# Patient Record
Sex: FEMALE | Race: WHITE | NOT HISPANIC OR LATINO | URBAN - METROPOLITAN AREA
[De-identification: names, ages, dates, MRNs, and addresses within clinical notes are randomized per-mention and may not be internally consistent; named-entity substitution may affect disease eponyms.]

---

## 2021-09-09 ENCOUNTER — TELEPHONE ENCOUNTER (OUTPATIENT)
Dept: URBAN - METROPOLITAN AREA CLINIC 113 | Facility: CLINIC | Age: 76
End: 2021-09-09

## 2021-09-14 ENCOUNTER — OFFICE VISIT (OUTPATIENT)
Dept: URBAN - METROPOLITAN AREA CLINIC 113 | Facility: CLINIC | Age: 76
End: 2021-09-14
Payer: MEDICARE

## 2021-09-14 ENCOUNTER — LAB OUTSIDE AN ENCOUNTER (OUTPATIENT)
Dept: URBAN - METROPOLITAN AREA CLINIC 113 | Facility: CLINIC | Age: 76
End: 2021-09-14

## 2021-09-14 ENCOUNTER — WEB ENCOUNTER (OUTPATIENT)
Dept: URBAN - METROPOLITAN AREA CLINIC 113 | Facility: CLINIC | Age: 76
End: 2021-09-14

## 2021-09-14 VITALS
HEART RATE: 100 BPM | TEMPERATURE: 98.2 F | SYSTOLIC BLOOD PRESSURE: 139 MMHG | HEIGHT: 64 IN | WEIGHT: 125 LBS | BODY MASS INDEX: 21.34 KG/M2 | DIASTOLIC BLOOD PRESSURE: 78 MMHG

## 2021-09-14 DIAGNOSIS — K59.01 CONSTIPATION BY DELAYED COLONIC TRANSIT: ICD-10-CM

## 2021-09-14 DIAGNOSIS — R93.5 ABNORMAL CT OF THE ABDOMEN: ICD-10-CM

## 2021-09-14 DIAGNOSIS — R63.4 WEIGHT LOSS: ICD-10-CM

## 2021-09-14 PROCEDURE — 99204 OFFICE O/P NEW MOD 45 MIN: CPT | Performed by: INTERNAL MEDICINE

## 2021-09-14 RX ORDER — ACETAMINOPHEN 325 MG
1 TABLET AS NEEDED TABLET ORAL
Status: ACTIVE | COMMUNITY

## 2021-09-14 RX ORDER — CALCIUM CARBONATE/VITAMIN D3 600MG-5MCG
1 TABLET WITH A MEAL TABLET ORAL ONCE A DAY
Status: ACTIVE | COMMUNITY

## 2021-09-14 RX ORDER — RED YEAST RICE 600 MG
AS DIRECTED CAPSULE ORAL
Status: ACTIVE | COMMUNITY

## 2021-09-14 RX ORDER — OMEPRAZOLE 10 MG/1
1 CAPSULE 30 MINUTES BEFORE MORNING MEAL CAPSULE, DELAYED RELEASE ORAL ONCE A DAY
Status: ACTIVE | COMMUNITY

## 2021-09-14 RX ORDER — POLYETHYLENE GLYCOL 3350, SODIUM CHLORIDE, SODIUM BICARBONATE, POTASSIUM CHLORIDE 420; 11.2; 5.72; 1.48 G/4L; G/4L; G/4L; G/4L
420 G POWDER, FOR SOLUTION ORAL ONCE
Qty: 420 G | Refills: 0 | OUTPATIENT
Start: 2021-09-14 | End: 2021-09-15

## 2021-09-14 RX ORDER — BIOTIN 10 MG
1 TABLET TABLET ORAL ONCE A DAY
Status: ACTIVE | COMMUNITY

## 2021-09-14 RX ORDER — ALPRAZOLAM 0.5 MG/1
1 TABLET TABLET ORAL TWICE A DAY
Status: ACTIVE | COMMUNITY

## 2021-09-14 RX ORDER — HYDROCHLOROTHIAZIDE 25 MG/1
1 TABLET IN THE MORNING TABLET ORAL ONCE A DAY
Status: ACTIVE | COMMUNITY

## 2021-09-14 RX ORDER — LEVOTHYROXINE SODIUM 100 UG/1
1 TABLET IN THE MORNING ON AN EMPTY STOMACH TABLET ORAL ONCE A DAY
Status: ACTIVE | COMMUNITY

## 2021-09-14 RX ORDER — MAGNESIUM OXIDE/MAG AA CHELATE 300 MG
1 CAPSULE WITH A MEAL CAPSULE ORAL ONCE A DAY
Status: ACTIVE | COMMUNITY

## 2021-09-14 RX ORDER — B-COMPLEX WITH VITAMIN C
1 TABLET TABLET ORAL ONCE A DAY
Status: ACTIVE | COMMUNITY

## 2021-09-14 RX ORDER — MULTIVITAMIN
1 TABLET TABLET ORAL ONCE A DAY
Status: ACTIVE | COMMUNITY

## 2021-09-14 NOTE — HPI-TODAY'S VISIT:
77 yo woman referred by Dr. Prabhakar for evaluation of abnormal CT scan. A copy of today's visit will be forwarded to the referring provider.  She has a past medical history of HTN, HLD, prediabetes, dyspnea on exertion, unintentional weight loss, anxiety, chronic osteoarthritis. A CT of the chest abdomen and pelvis with contrast was performed given her complaints of dyspnea on exertion and weight loss of 20 pounds over a 6 month period. Chest CT was negative for pulmonary embolus. There were thyroid nodules, which was recommended follow up with thyroid ultrasound. Abdominal CT findings demonstrate a possible rectal mass with constipation and possible obstruction the stool ball in the rectum. A colonoscopy was recommended to exclude cancer. There were noninflammed colonic diverticula, vascular calcifications, and gallstones. Labs revealed a normal lipase, normal CEA, normal CA 19-9, normal TSH, elevated cholesterol, normal BMP and CBC.  She continues to lose weight. She tells me that her total weight loss is up to 25 pounds. She tells me that her clothes are falling off of her, and she has nothing to wear. There is no heartburn or dysphagia. There is no nausea or vomiting. No abdominal pain. There is no early satiety. She tells me that her appetite is decreasing with age. She eats 2 meals per day, usually brunch and dinner. She typically eats an egg, bran or banana muffin, king, orange juice and milk for her brunch time meal. She normally eats vegetables, chicken for dinner. She tells me her meals sizes are about a handful. She tells me that she is full after eating, but is able to complete her meals. She has bowel movements variably. For the last couple of days, she has had looser stools following  eating artichoke dip. Other times, she can go 3 or 4 days sans bowel movements. There is no blood per rectum. There is no proctalgia.

## 2021-10-19 ENCOUNTER — OFFICE VISIT (OUTPATIENT)
Dept: URBAN - METROPOLITAN AREA SURGERY CENTER 25 | Facility: SURGERY CENTER | Age: 76
End: 2021-10-19
Payer: MEDICARE

## 2021-10-19 DIAGNOSIS — R93.3 ABN FINDINGS-GI TRACT: ICD-10-CM

## 2021-10-19 PROCEDURE — G8907 PT DOC NO EVENTS ON DISCHARG: HCPCS | Performed by: INTERNAL MEDICINE

## 2021-10-19 PROCEDURE — 45378 DIAGNOSTIC COLONOSCOPY: CPT | Performed by: INTERNAL MEDICINE

## 2021-11-16 ENCOUNTER — OFFICE VISIT (OUTPATIENT)
Dept: URBAN - METROPOLITAN AREA CLINIC 113 | Facility: CLINIC | Age: 76
End: 2021-11-16
Payer: MEDICARE

## 2021-11-16 VITALS
TEMPERATURE: 97.7 F | SYSTOLIC BLOOD PRESSURE: 156 MMHG | HEIGHT: 64 IN | BODY MASS INDEX: 20.14 KG/M2 | HEART RATE: 93 BPM | DIASTOLIC BLOOD PRESSURE: 82 MMHG | RESPIRATION RATE: 18 BRPM | WEIGHT: 118 LBS

## 2021-11-16 DIAGNOSIS — R63.4 WEIGHT LOSS: ICD-10-CM

## 2021-11-16 DIAGNOSIS — K59.01 CONSTIPATION BY DELAYED COLONIC TRANSIT: ICD-10-CM

## 2021-11-16 DIAGNOSIS — R93.5 ABNORMAL CT OF THE ABDOMEN: ICD-10-CM

## 2021-11-16 PROCEDURE — 99213 OFFICE O/P EST LOW 20 MIN: CPT | Performed by: NURSE PRACTITIONER

## 2021-11-16 RX ORDER — ACETAMINOPHEN 325 MG
1 TABLET AS NEEDED TABLET ORAL
Status: ACTIVE | COMMUNITY

## 2021-11-16 RX ORDER — RED YEAST RICE 600 MG
AS DIRECTED CAPSULE ORAL
Status: ACTIVE | COMMUNITY

## 2021-11-16 RX ORDER — B-COMPLEX WITH VITAMIN C
1 TABLET TABLET ORAL ONCE A DAY
Status: ACTIVE | COMMUNITY

## 2021-11-16 RX ORDER — MAGNESIUM OXIDE/MAG AA CHELATE 300 MG
1 CAPSULE WITH A MEAL CAPSULE ORAL ONCE A DAY
Status: ACTIVE | COMMUNITY

## 2021-11-16 RX ORDER — OMEPRAZOLE 10 MG/1
1 CAPSULE 30 MINUTES BEFORE MORNING MEAL CAPSULE, DELAYED RELEASE ORAL ONCE A DAY
Status: ACTIVE | COMMUNITY

## 2021-11-16 RX ORDER — CALCIUM CARBONATE/VITAMIN D3 600MG-5MCG
1 TABLET WITH A MEAL TABLET ORAL ONCE A DAY
Status: ACTIVE | COMMUNITY

## 2021-11-16 RX ORDER — HYDROCHLOROTHIAZIDE 25 MG/1
1 TABLET IN THE MORNING TABLET ORAL ONCE A DAY
Status: DISCONTINUED | COMMUNITY

## 2021-11-16 RX ORDER — ALPRAZOLAM 0.5 MG/1
1 TABLET TABLET ORAL TWICE A DAY
Status: ACTIVE | COMMUNITY

## 2021-11-16 RX ORDER — LEVOTHYROXINE SODIUM 100 UG/1
1 TABLET IN THE MORNING ON AN EMPTY STOMACH TABLET ORAL ONCE A DAY
Status: ACTIVE | COMMUNITY

## 2021-11-16 RX ORDER — MULTIVITAMIN
1 TABLET TABLET ORAL ONCE A DAY
Status: ACTIVE | COMMUNITY

## 2021-11-16 RX ORDER — BIOTIN 10 MG
1 TABLET TABLET ORAL ONCE A DAY
Status: ACTIVE | COMMUNITY

## 2021-11-16 NOTE — HPI-OTHER HISTORIES
A CT of the chest abdomen and pelvis with contrast was performed given her complaints of dyspnea on exertion and weight loss of 20 pounds over a 6 month period. Chest CT was negative for pulmonary embolus. There were thyroid nodules, which was recommended follow up with thyroid ultrasound. Abdominal CT findings demonstrate a possible rectal mass with constipation and possible obstruction the stool ball in the rectum. A colonoscopy was recommended to exclude cancer. There were noninflammed colonic diverticula, vascular calcifications, and gallstones. Labs revealed a normal lipase, normal CEA, normal CA 19-9, normal TSH, elevated cholesterol, normal BMP and CBC.

## 2021-11-16 NOTE — HPI-TODAY'S VISIT:
75 yo female with a history of hypertension, hyperlipidemia, prediabetes, presenting for follow-up after colonoscopy, performed for evaluation of abnormal CT findings.  She was seen in the office on 9/14/21 for evaluation of abnormal CT findings, possible rectal mass, in the setting of unintentional weight loss. She was recommended a daily bowel regimen with MiraLAX for management of constipation and a diagnostic colonoscopy was planned.  Colonoscopy 10/19/21 revealed moderate external hemorrhoids but was otherwise unremarkable.  Her constipation has responded to milk of magnesia. She was experiencing diarrhea with 2 tablespoons nightly so she reduced this to 1 tablespoon. She is having 1-2 nonbloody stools daily on this regimen. No abdominal pain, nausea or vomiting. Her appetite has improved, but weight has declined an additional 7lb since the time of her last visit. She thinks this may be related to her thyroid, which will be followed up later this week.

## 2021-12-30 ENCOUNTER — CLAIMS CREATED FROM THE CLAIM WINDOW (OUTPATIENT)
Dept: URBAN - METROPOLITAN AREA CLINIC 113 | Facility: CLINIC | Age: 76
End: 2021-12-30
Payer: MEDICARE

## 2021-12-30 ENCOUNTER — LAB OUTSIDE AN ENCOUNTER (OUTPATIENT)
Dept: URBAN - METROPOLITAN AREA CLINIC 113 | Facility: CLINIC | Age: 76
End: 2021-12-30

## 2021-12-30 VITALS
RESPIRATION RATE: 18 BRPM | HEART RATE: 89 BPM | BODY MASS INDEX: 19.81 KG/M2 | WEIGHT: 116 LBS | HEIGHT: 64 IN | DIASTOLIC BLOOD PRESSURE: 76 MMHG | TEMPERATURE: 98 F | SYSTOLIC BLOOD PRESSURE: 162 MMHG

## 2021-12-30 DIAGNOSIS — K80.20 CHOLELITHIASIS: ICD-10-CM

## 2021-12-30 DIAGNOSIS — K59.01 CONSTIPATION: ICD-10-CM

## 2021-12-30 DIAGNOSIS — R63.4 WEIGHT LOSS: ICD-10-CM

## 2021-12-30 DIAGNOSIS — K59.09 CONSTIPATION: ICD-10-CM

## 2021-12-30 DIAGNOSIS — K64.8 EXTERNAL HEMORRHOIDS: ICD-10-CM

## 2021-12-30 DIAGNOSIS — K59.01 CONSTIPATION BY DELAYED COLONIC TRANSIT: ICD-10-CM

## 2021-12-30 PROBLEM — 23913003 EXTERNAL HEMORRHOIDS: Status: ACTIVE | Noted: 2021-12-30

## 2021-12-30 PROBLEM — 14760008 CONSTIPATION: Status: ACTIVE | Noted: 2021-12-30

## 2021-12-30 PROBLEM — 266474003 CHOLELITHIASIS: Status: ACTIVE | Noted: 2021-12-30

## 2021-12-30 PROCEDURE — 99214 OFFICE O/P EST MOD 30 MIN: CPT | Performed by: INTERNAL MEDICINE

## 2021-12-30 RX ORDER — LEVOTHYROXINE SODIUM 100 UG/1
1 TABLET IN THE MORNING ON AN EMPTY STOMACH TABLET ORAL ONCE A DAY
Status: ACTIVE | COMMUNITY

## 2021-12-30 RX ORDER — BIOTIN 10 MG
1 TABLET TABLET ORAL ONCE A DAY
Status: ACTIVE | COMMUNITY

## 2021-12-30 RX ORDER — MULTIVITAMIN
1 TABLET TABLET ORAL ONCE A DAY
Status: ACTIVE | COMMUNITY

## 2021-12-30 RX ORDER — B-COMPLEX WITH VITAMIN C
1 TABLET TABLET ORAL ONCE A DAY
Status: ACTIVE | COMMUNITY

## 2021-12-30 RX ORDER — MAGNESIUM OXIDE/MAG AA CHELATE 300 MG
1 CAPSULE WITH A MEAL CAPSULE ORAL ONCE A DAY
Status: ACTIVE | COMMUNITY

## 2021-12-30 RX ORDER — ACETAMINOPHEN 325 MG
1 TABLET AS NEEDED TABLET ORAL
Status: ACTIVE | COMMUNITY

## 2021-12-30 RX ORDER — OMEPRAZOLE 10 MG/1
1 CAPSULE 30 MINUTES BEFORE MORNING MEAL CAPSULE, DELAYED RELEASE ORAL ONCE A DAY
Status: ACTIVE | COMMUNITY

## 2021-12-30 RX ORDER — RED YEAST RICE 600 MG
AS DIRECTED CAPSULE ORAL
Status: ACTIVE | COMMUNITY

## 2021-12-30 RX ORDER — ALPRAZOLAM 0.5 MG/1
1 TABLET TABLET ORAL TWICE A DAY
Status: ACTIVE | COMMUNITY

## 2021-12-30 RX ORDER — CALCIUM CARBONATE/VITAMIN D3 600MG-5MCG
1 TABLET WITH A MEAL TABLET ORAL ONCE A DAY
Status: ACTIVE | COMMUNITY

## 2021-12-30 NOTE — HPI-TODAY'S VISIT:
Very 76-year-old female follows up with a history of constipation.  She underwent recent colonoscopy . She has been using intermittent MOM with success. But she is working on the timing and dosage. No rectal bleeding or melena. No abdominal pain. No further episodes of impaction.  . Labs December 2021.  CBC BMP were normal . Colonoscopy October 2021.  Moderate external hemorrhoids, otherwise negative . CT scan chest abdomen pelvis August 2021.  Solitary gallstone.  Suggestive of rectal mass which was later refuted by colonoscopy. Heterogeneous thyroid.

## 2022-03-25 ENCOUNTER — OFFICE VISIT (OUTPATIENT)
Dept: URBAN - METROPOLITAN AREA CLINIC 113 | Facility: CLINIC | Age: 77
End: 2022-03-25
Payer: MEDICARE

## 2022-03-25 VITALS
SYSTOLIC BLOOD PRESSURE: 155 MMHG | HEART RATE: 83 BPM | RESPIRATION RATE: 18 BRPM | HEIGHT: 64 IN | BODY MASS INDEX: 18.95 KG/M2 | DIASTOLIC BLOOD PRESSURE: 84 MMHG | TEMPERATURE: 97.1 F | WEIGHT: 111 LBS

## 2022-03-25 DIAGNOSIS — R19.7 DIARRHEA: ICD-10-CM

## 2022-03-25 DIAGNOSIS — R63.4 WEIGHT LOSS: ICD-10-CM

## 2022-03-25 PROCEDURE — 99214 OFFICE O/P EST MOD 30 MIN: CPT | Performed by: NURSE PRACTITIONER

## 2022-03-25 RX ORDER — CALCIUM CARBONATE/VITAMIN D3 600MG-5MCG
1 TABLET WITH A MEAL TABLET ORAL ONCE A DAY
Status: ACTIVE | COMMUNITY

## 2022-03-25 RX ORDER — RED YEAST RICE 600 MG
AS DIRECTED CAPSULE ORAL
Status: ACTIVE | COMMUNITY

## 2022-03-25 RX ORDER — LEVOTHYROXINE SODIUM 100 UG/1
1 TABLET IN THE MORNING ON AN EMPTY STOMACH TABLET ORAL ONCE A DAY
Status: DISCONTINUED | COMMUNITY

## 2022-03-25 RX ORDER — OMEPRAZOLE 40 MG/1
1 CAPSULE 30 MINUTES BEFORE MORNING MEAL CAPSULE, DELAYED RELEASE ORAL ONCE A DAY
Status: ACTIVE | COMMUNITY

## 2022-03-25 RX ORDER — ACETAMINOPHEN 325 MG
1 TABLET AS NEEDED TABLET ORAL
Status: ACTIVE | COMMUNITY

## 2022-03-25 RX ORDER — ALPRAZOLAM 0.5 MG/1
1 TABLET TABLET ORAL TWICE A DAY
Status: ACTIVE | COMMUNITY

## 2022-03-25 RX ORDER — B-COMPLEX WITH VITAMIN C
1 TABLET TABLET ORAL ONCE A DAY
Status: ACTIVE | COMMUNITY

## 2022-03-25 RX ORDER — BIOTIN 10 MG
1 TABLET TABLET ORAL ONCE A DAY
Status: ACTIVE | COMMUNITY

## 2022-03-25 RX ORDER — MULTIVITAMIN
1 TABLET TABLET ORAL ONCE A DAY
Status: ACTIVE | COMMUNITY

## 2022-03-25 RX ORDER — MAGNESIUM OXIDE/MAG AA CHELATE 300 MG
1 CAPSULE WITH A MEAL CAPSULE ORAL ONCE A DAY
Status: ACTIVE | COMMUNITY

## 2022-03-25 NOTE — HPI-OTHER HISTORIES
Colonoscopy 10/19/21 revealed moderate external hemorrhoids but was otherwise unremarkable.  A CT of the chest abdomen and pelvis with contrast was performed given her complaints of dyspnea on exertion and weight loss of 20 pounds over a 6 month period. Chest CT was negative for pulmonary embolus. There were thyroid nodules, which was recommended follow up with thyroid ultrasound. Abdominal CT findings demonstrate a possible rectal mass with constipation and possible obstruction the stool ball in the rectum. A colonoscopy was recommended to exclude cancer. There were noninflammed colonic diverticula, vascular calcifications, and gallstones. Labs revealed a normal lipase, normal CEA, normal CA 19-9, normal TSH, elevated cholesterol, normal BMP and CBC.

## 2022-03-25 NOTE — HPI-TODAY'S VISIT:
77 yo female with a history of hypertension, hyperlipidemia, prediabetes, presenting for evaluation of diarrhea. She was seen in the office in December 2021 for follow-up of weight loss. Prior CT a/p and colonoscopy were unrevealing for source. An EGD was recommended, but never scheduled. Her constipation was managed with milk of magnesia.   Within the last few weeks, she has experienced an exacerbation of diarrhea. She initially attributed this to taking one dose of MiraLAX prescribed by her PCP, but symptoms persisted. She has had numerous loose, watery stools, following any oral intake for the last 8 days. She denies blood per rectum or nocturnal defecation. She took a dose of Imodium last night and has not had a bowel movement since. No abdominal pain, nausea or vomiting. Her weight has declined an additional 5lb since the time of her last visit. She is ready to proceed with upper endoscopy as previously recommended. The history is somewhat difficult to obtain. No recent antibiotic use.

## 2022-03-30 LAB
ADENOVIRUS F 40/41: NOT DETECTED
ASTROVIRUS: NOT DETECTED
C DIFFICILE TOXIN A/B: NOT DETECTED
CALPROTECTIN, FECAL: 43
CAMPYLOBACTER: NOT DETECTED
CRYPTOSPORIDIUM: NOT DETECTED
CYCLOSPORA CAYETANENSIS: NOT DETECTED
E COLI O157: (no result)
ENTAMOEBA HISTOLYTICA: NOT DETECTED
ENTEROAGGREGATIVE E COLI: NOT DETECTED
ENTEROPATHOGENIC E COLI: NOT DETECTED
ENTEROTOXIGENIC E COLI: NOT DETECTED
GIARDIA LAMBLIA: NOT DETECTED
NOROVIRUS GI/GII: NOT DETECTED
PLESIOMONAS SHIGELLOIDES: NOT DETECTED
ROTAVIRUS A: NOT DETECTED
SALMONELLA: NOT DETECTED
SAPOVIRUS: NOT DETECTED
SHIGA-TOXIN-PRODUCING E COLI: NOT DETECTED
SHIGELLA/ENTEROINVASIVE E COLI: NOT DETECTED
VIBRIO CHOLERAE: NOT DETECTED
VIBRIO: NOT DETECTED
YERSINIA ENTEROCOLITICA: NOT DETECTED

## 2022-04-12 ENCOUNTER — CLAIMS CREATED FROM THE CLAIM WINDOW (OUTPATIENT)
Dept: URBAN - METROPOLITAN AREA CLINIC 4 | Facility: CLINIC | Age: 77
End: 2022-04-12
Payer: MEDICARE

## 2022-04-12 ENCOUNTER — OFFICE VISIT (OUTPATIENT)
Dept: URBAN - METROPOLITAN AREA SURGERY CENTER 25 | Facility: SURGERY CENTER | Age: 77
End: 2022-04-12
Payer: MEDICARE

## 2022-04-12 ENCOUNTER — TELEPHONE ENCOUNTER (OUTPATIENT)
Dept: URBAN - METROPOLITAN AREA CLINIC 113 | Facility: CLINIC | Age: 77
End: 2022-04-12

## 2022-04-12 ENCOUNTER — LAB OUTSIDE AN ENCOUNTER (OUTPATIENT)
Dept: URBAN - METROPOLITAN AREA CLINIC 113 | Facility: CLINIC | Age: 77
End: 2022-04-12

## 2022-04-12 DIAGNOSIS — K31.7 POLYP OF STOMACH AND DUODENUM: ICD-10-CM

## 2022-04-12 DIAGNOSIS — K21.9 GASTROESOPHAGEAL REFLUX DISEASE: ICD-10-CM

## 2022-04-12 DIAGNOSIS — K31.89 REACTIVE GASTROPATHY: ICD-10-CM

## 2022-04-12 DIAGNOSIS — K31.89 FOCAL FOVEOLAR HYPERPLASIA: ICD-10-CM

## 2022-04-12 DIAGNOSIS — K31.7 POLYP OF STOMACH FUNDIC GLAND: ICD-10-CM

## 2022-04-12 DIAGNOSIS — K21.9 GASTRO-ESOPHAGEAL REFLUX DISEASE WITHOUT ESOPHAGITIS: ICD-10-CM

## 2022-04-12 DIAGNOSIS — K29.70 GASTRITIS, UNSPECIFIED, WITHOUT BLEEDING: ICD-10-CM

## 2022-04-12 PROBLEM — 62315008 DIARRHEA: Status: ACTIVE | Noted: 2022-04-12

## 2022-04-12 PROBLEM — 816160009 WEIGHT LOSS: Status: ACTIVE | Noted: 2021-12-30

## 2022-04-12 PROCEDURE — 88312 SPECIAL STAINS GROUP 1: CPT | Performed by: PATHOLOGY

## 2022-04-12 PROCEDURE — G8907 PT DOC NO EVENTS ON DISCHARG: HCPCS | Performed by: INTERNAL MEDICINE

## 2022-04-12 PROCEDURE — 88305 TISSUE EXAM BY PATHOLOGIST: CPT | Performed by: PATHOLOGY

## 2022-04-12 PROCEDURE — 43239 EGD BIOPSY SINGLE/MULTIPLE: CPT | Performed by: INTERNAL MEDICINE

## 2022-04-12 RX ORDER — OMEPRAZOLE 40 MG/1
1 CAPSULE 30 MINUTES BEFORE MORNING MEAL CAPSULE, DELAYED RELEASE ORAL ONCE A DAY
Status: ACTIVE | COMMUNITY

## 2022-04-12 RX ORDER — BIOTIN 10 MG
1 TABLET TABLET ORAL ONCE A DAY
Status: ACTIVE | COMMUNITY

## 2022-04-12 RX ORDER — B-COMPLEX WITH VITAMIN C
1 TABLET TABLET ORAL ONCE A DAY
Status: ACTIVE | COMMUNITY

## 2022-04-12 RX ORDER — ACETAMINOPHEN 325 MG
1 TABLET AS NEEDED TABLET ORAL
Status: ACTIVE | COMMUNITY

## 2022-04-12 RX ORDER — MULTIVITAMIN
1 TABLET TABLET ORAL ONCE A DAY
Status: ACTIVE | COMMUNITY

## 2022-04-12 RX ORDER — ALPRAZOLAM 0.5 MG/1
1 TABLET TABLET ORAL TWICE A DAY
Status: ACTIVE | COMMUNITY

## 2022-04-12 RX ORDER — MAGNESIUM OXIDE/MAG AA CHELATE 300 MG
1 CAPSULE WITH A MEAL CAPSULE ORAL ONCE A DAY
Status: ACTIVE | COMMUNITY

## 2022-04-12 RX ORDER — RED YEAST RICE 600 MG
AS DIRECTED CAPSULE ORAL
Status: ACTIVE | COMMUNITY

## 2022-04-12 RX ORDER — CALCIUM CARBONATE/VITAMIN D3 600MG-5MCG
1 TABLET WITH A MEAL TABLET ORAL ONCE A DAY
Status: ACTIVE | COMMUNITY

## 2022-04-17 LAB
A/G RATIO: 1.9
ALBUMIN: 4.4
ALKALINE PHOSPHATASE: 53
ALT (SGPT): 38
AST (SGOT): 35
BILIRUBIN, TOTAL: 0.7
BUN/CREATININE RATIO: 18
BUN: 14
C-REACTIVE PROTEIN, QUANT: <1
CALCIUM: 9.7
CARBON DIOXIDE, TOTAL: 24
CHLORIDE: 99
CREATININE: 0.79
DEAMIDATED GLIADIN ABS, IGA: 7
DEAMIDATED GLIADIN ABS, IGG: 1
EGFR: 77
ENDOMYSIAL ANTIBODY IGA: NEGATIVE
GLOBULIN, TOTAL: 2.3
GLUCOSE: 92
HEMATOCRIT: 41
HEMOGLOBIN: 13.7
IMMUNOGLOBULIN A, QN, SERUM: 351
IMMUNOGLOBULIN E, TOTAL: 2
IMMUNOGLOBULIN G, QN, SERUM: 667
IMMUNOGLOBULIN M, QN, SERUM: 201
LIPASE: 28
MCH: 31.8
MCHC: 33.4
MCV: 95
NRBC: (no result)
PLATELETS: 203
POTASSIUM: 4.4
PROTEIN, TOTAL: 6.7
RBC: 4.31
RDW: 11.9
SEDIMENTATION RATE-WESTERGREN: 2
SODIUM: 141
T-TRANSGLUTAMINASE (TTG) IGA: <2
T-TRANSGLUTAMINASE (TTG) IGG: 3
WBC: 4.6

## 2022-04-19 ENCOUNTER — TELEPHONE ENCOUNTER (OUTPATIENT)
Dept: URBAN - METROPOLITAN AREA CLINIC 113 | Facility: CLINIC | Age: 77
End: 2022-04-19

## 2022-04-25 ENCOUNTER — TELEPHONE ENCOUNTER (OUTPATIENT)
Dept: URBAN - METROPOLITAN AREA CLINIC 113 | Facility: CLINIC | Age: 77
End: 2022-04-25

## 2022-04-25 ENCOUNTER — OFFICE VISIT (OUTPATIENT)
Dept: URBAN - METROPOLITAN AREA CLINIC 113 | Facility: CLINIC | Age: 77
End: 2022-04-25
Payer: MEDICARE

## 2022-04-25 VITALS
BODY MASS INDEX: 18.27 KG/M2 | SYSTOLIC BLOOD PRESSURE: 157 MMHG | TEMPERATURE: 97.8 F | DIASTOLIC BLOOD PRESSURE: 87 MMHG | WEIGHT: 107 LBS | HEART RATE: 92 BPM | RESPIRATION RATE: 22 BRPM | HEIGHT: 64 IN

## 2022-04-25 DIAGNOSIS — K56.41 FECAL IMPACTION: ICD-10-CM

## 2022-04-25 DIAGNOSIS — R93.5 ABNORMAL ABDOMINAL CT SCAN: ICD-10-CM

## 2022-04-25 PROCEDURE — 99214 OFFICE O/P EST MOD 30 MIN: CPT | Performed by: NURSE PRACTITIONER

## 2022-04-25 RX ORDER — B-COMPLEX WITH VITAMIN C
1 TABLET TABLET ORAL ONCE A DAY
Status: ACTIVE | COMMUNITY

## 2022-04-25 RX ORDER — POLYETHYLENE GLYCOL 3350, SODIUM SULFATE ANHYDROUS, SODIUM BICARBONATE, SODIUM CHLORIDE, POTASSIUM CHLORIDE 236; 22.74; 6.74; 5.86; 2.97 G/4L; G/4L; G/4L; G/4L; G/4L
AS DIRECTED POWDER, FOR SOLUTION ORAL ONCE
Qty: 236 GRAMS | Refills: 1 | OUTPATIENT

## 2022-04-25 RX ORDER — OMEPRAZOLE 40 MG/1
1 CAPSULE 30 MINUTES BEFORE MORNING MEAL CAPSULE, DELAYED RELEASE ORAL ONCE A DAY
Status: ACTIVE | COMMUNITY

## 2022-04-25 RX ORDER — MULTIVITAMIN
1 TABLET TABLET ORAL ONCE A DAY
Status: ACTIVE | COMMUNITY

## 2022-04-25 RX ORDER — CALCIUM CARBONATE/VITAMIN D3 600MG-5MCG
1 TABLET WITH A MEAL TABLET ORAL ONCE A DAY
Status: ACTIVE | COMMUNITY

## 2022-04-25 RX ORDER — RED YEAST RICE 600 MG
AS DIRECTED CAPSULE ORAL
Status: ACTIVE | COMMUNITY

## 2022-04-25 RX ORDER — POLYETHYLENE GLYCOL 3350, SODIUM CHLORIDE, SODIUM BICARBONATE AND POTASSIUM CHLORIDE 420G
AS DIRECTED KIT ORAL ONCE
Qty: 420 GRAM | Refills: 0 | OUTPATIENT
Start: 2022-04-25 | End: 2022-04-26

## 2022-04-25 RX ORDER — ALPRAZOLAM 0.5 MG/1
1 TABLET TABLET ORAL TWICE A DAY
Status: ACTIVE | COMMUNITY

## 2022-04-25 RX ORDER — MAGNESIUM OXIDE/MAG AA CHELATE 300 MG
1 CAPSULE WITH A MEAL CAPSULE ORAL ONCE A DAY
Status: ACTIVE | COMMUNITY

## 2022-04-25 RX ORDER — BIOTIN 10 MG
1 TABLET TABLET ORAL ONCE A DAY
Status: ACTIVE | COMMUNITY

## 2022-04-25 RX ORDER — ACETAMINOPHEN 325 MG
1 TABLET AS NEEDED TABLET ORAL
Status: ACTIVE | COMMUNITY

## 2022-04-25 NOTE — HPI-TODAY'S VISIT:
She was seen in the office 3/25/2022 for evaluation of a diarrhea predominant change in bowel habits with associated fecal incontinence.  Stool studies were planned to exclude an infectious or inflammatory process, with consideration for repeat colonoscopy with random biopsies to exclude microscopic colitis.  She was to hold MiraLAX and magnesium supplements until the diarrhea resolved.  Regarding progressive unintentional weight loss, an upper endoscopy was planned as recommended the following visit.  Prior CT of the abdomen and pelvis and colonoscopy were nondiagnostic.  She was to continue nutritional supplementation with boost or Ensure. Stool studies 3/28/2022:Negative GI pathogen panel.  Normal fecal calprotectin. EGD 4/12/2022 revealed erythematous nonerosive granular mucosa at the GE J, a small 2 cm hiatal hernia, several benign-appearing 4 mm gastric body polyps, mild nonerosive antral gastritis, normal examined duodenum status post D2 biopsies. Due to persistent diarrhea, additional labs including celiac serologies, repeat stool studies to include a fecal calprotectin, and repeat CT of the abdomen and pelvis were planned.  Repeat colonoscopy with random colon biopsies was considered. CT of the abdomen and pelvis with contrast 4/22/2022 revealed significant rectal vault distention by fecal material with measured rectal diameter of 9.7 cm and some surrounding perirectal edema or induration making it difficult to exclude stercoral proctitis in the appropriate clinical setting.  Fecal load elsewhere within the large bowel does not appear excessive.  Also notable for cholelithiasis. Labs 4/12/2022:Negative celiac serologies.  Normal serum immunoglobulins.  Normal ESR and CRP.  Normal lipase.  CBC and CMP unremarkable. Repeat stool studies were not performed due to difficulty obtaining a specimen. Her symptoms are unchanged.  She complains of constant diarrhea with multiple loose stools and incontinence throughout the day.  She often does not feel the urge to have a bowel movement.  She only took Konsyl twice as she could not tolerate the taste.  She does take Imodium 2 tablets when leaving the house for an appointment which does provide temporary relief.  She denies abdominal pain, nausea or vomiting.  Her chief complaint today regards ongoing difficulty with urinary incontinence.  This is worse throughout the night, requiring her to sleep with a towel between her legs.  Often, when she awakens in the morning, she will experience a rush of urine as she stands up from the bed.  A friend accompanies her to the visit and attempts to assist with the history which is somewhat difficult to obtain due to her memory loss.

## 2022-04-26 ENCOUNTER — TELEPHONE ENCOUNTER (OUTPATIENT)
Dept: URBAN - METROPOLITAN AREA CLINIC 113 | Facility: CLINIC | Age: 77
End: 2022-04-26

## 2022-04-28 ENCOUNTER — TELEPHONE ENCOUNTER (OUTPATIENT)
Dept: URBAN - METROPOLITAN AREA CLINIC 113 | Facility: CLINIC | Age: 77
End: 2022-04-28

## 2022-05-05 ENCOUNTER — TELEPHONE ENCOUNTER (OUTPATIENT)
Dept: URBAN - METROPOLITAN AREA CLINIC 113 | Facility: CLINIC | Age: 77
End: 2022-05-05

## 2022-05-19 PROBLEM — 15634181000119107 CT OF ABDOMEN ABNORMAL: Status: ACTIVE | Noted: 2022-05-19

## 2022-05-23 ENCOUNTER — OFFICE VISIT (OUTPATIENT)
Dept: URBAN - METROPOLITAN AREA CLINIC 113 | Facility: CLINIC | Age: 77
End: 2022-05-23

## 2022-06-13 ENCOUNTER — TELEPHONE ENCOUNTER (OUTPATIENT)
Dept: URBAN - METROPOLITAN AREA CLINIC 113 | Facility: CLINIC | Age: 77
End: 2022-06-13

## 2022-06-14 ENCOUNTER — TELEPHONE ENCOUNTER (OUTPATIENT)
Dept: URBAN - METROPOLITAN AREA CLINIC 113 | Facility: CLINIC | Age: 77
End: 2022-06-14

## 2022-06-17 ENCOUNTER — OFFICE VISIT (OUTPATIENT)
Dept: URBAN - METROPOLITAN AREA SURGERY CENTER 25 | Facility: SURGERY CENTER | Age: 77
End: 2022-06-17
Payer: MEDICARE

## 2022-06-17 ENCOUNTER — WEB ENCOUNTER (OUTPATIENT)
Dept: URBAN - METROPOLITAN AREA SURGERY CENTER 25 | Facility: SURGERY CENTER | Age: 77
End: 2022-06-17

## 2022-06-17 ENCOUNTER — CLAIMS CREATED FROM THE CLAIM WINDOW (OUTPATIENT)
Dept: URBAN - METROPOLITAN AREA CLINIC 4 | Facility: CLINIC | Age: 77
End: 2022-06-17
Payer: MEDICARE

## 2022-06-17 DIAGNOSIS — K52.9 CHRONIC DIARRHEA: ICD-10-CM

## 2022-06-17 DIAGNOSIS — K63.89 OTHER SPECIFIED DISEASES OF INTESTINE: ICD-10-CM

## 2022-06-17 DIAGNOSIS — R93.3 ABN FINDINGS-GI TRACT: ICD-10-CM

## 2022-06-17 PROCEDURE — G8907 PT DOC NO EVENTS ON DISCHARG: HCPCS | Performed by: INTERNAL MEDICINE

## 2022-06-17 PROCEDURE — 88313 SPECIAL STAINS GROUP 2: CPT | Performed by: PATHOLOGY

## 2022-06-17 PROCEDURE — 88305 TISSUE EXAM BY PATHOLOGIST: CPT | Performed by: PATHOLOGY

## 2022-06-17 PROCEDURE — 88342 IMHCHEM/IMCYTCHM 1ST ANTB: CPT | Performed by: PATHOLOGY

## 2022-06-17 PROCEDURE — 45380 COLONOSCOPY AND BIOPSY: CPT | Performed by: INTERNAL MEDICINE

## 2022-06-17 RX ORDER — OMEPRAZOLE 40 MG/1
1 CAPSULE 30 MINUTES BEFORE MORNING MEAL CAPSULE, DELAYED RELEASE ORAL ONCE A DAY
Status: ACTIVE | COMMUNITY

## 2022-06-17 RX ORDER — POLYETHYLENE GLYCOL 3350, SODIUM SULFATE ANHYDROUS, SODIUM BICARBONATE, SODIUM CHLORIDE, POTASSIUM CHLORIDE 236; 22.74; 6.74; 5.86; 2.97 G/4L; G/4L; G/4L; G/4L; G/4L
AS DIRECTED POWDER, FOR SOLUTION ORAL ONCE
Qty: 236 GRAMS | Refills: 1 | Status: ACTIVE | COMMUNITY

## 2022-06-17 RX ORDER — ALPRAZOLAM 0.5 MG/1
1 TABLET TABLET ORAL TWICE A DAY
Status: ACTIVE | COMMUNITY

## 2022-06-17 RX ORDER — B-COMPLEX WITH VITAMIN C
1 TABLET TABLET ORAL ONCE A DAY
Status: ACTIVE | COMMUNITY

## 2022-06-17 RX ORDER — MULTIVITAMIN
1 TABLET TABLET ORAL ONCE A DAY
Status: ACTIVE | COMMUNITY

## 2022-06-17 RX ORDER — CALCIUM CARBONATE/VITAMIN D3 600MG-5MCG
1 TABLET WITH A MEAL TABLET ORAL ONCE A DAY
Status: ACTIVE | COMMUNITY

## 2022-06-17 RX ORDER — BIOTIN 10 MG
1 TABLET TABLET ORAL ONCE A DAY
Status: ACTIVE | COMMUNITY

## 2022-06-17 RX ORDER — RED YEAST RICE 600 MG
AS DIRECTED CAPSULE ORAL
Status: ACTIVE | COMMUNITY

## 2022-06-17 RX ORDER — MAGNESIUM OXIDE/MAG AA CHELATE 300 MG
1 CAPSULE WITH A MEAL CAPSULE ORAL ONCE A DAY
Status: ACTIVE | COMMUNITY

## 2022-06-17 RX ORDER — ACETAMINOPHEN 325 MG
1 TABLET AS NEEDED TABLET ORAL
Status: ACTIVE | COMMUNITY

## 2022-06-20 ENCOUNTER — OFFICE VISIT (OUTPATIENT)
Dept: URBAN - METROPOLITAN AREA CLINIC 113 | Facility: CLINIC | Age: 77
End: 2022-06-20

## 2022-06-28 ENCOUNTER — OFFICE VISIT (OUTPATIENT)
Dept: URBAN - METROPOLITAN AREA CLINIC 113 | Facility: CLINIC | Age: 77
End: 2022-06-28
Payer: MEDICARE

## 2022-06-28 VITALS
BODY MASS INDEX: 18.27 KG/M2 | TEMPERATURE: 97.1 F | HEIGHT: 64 IN | WEIGHT: 107 LBS | SYSTOLIC BLOOD PRESSURE: 148 MMHG | DIASTOLIC BLOOD PRESSURE: 80 MMHG | RESPIRATION RATE: 18 BRPM | HEART RATE: 101 BPM

## 2022-06-28 DIAGNOSIS — K56.41 FECAL IMPACTION: ICD-10-CM

## 2022-06-28 PROCEDURE — 99214 OFFICE O/P EST MOD 30 MIN: CPT | Performed by: NURSE PRACTITIONER

## 2022-06-28 RX ORDER — OXYMETAZOLINE HCL 0.05 %
2 SPRAYS IN EACH NOSTRIL AS NEEDED SPRAY, NON-AEROSOL (ML) NASAL TWICE A DAY
Status: ACTIVE | COMMUNITY

## 2022-06-28 RX ORDER — ACETAMINOPHEN 325 MG
1 TABLET AS NEEDED TABLET ORAL
Status: ACTIVE | COMMUNITY

## 2022-06-28 RX ORDER — BIOTIN 10 MG
1 TABLET TABLET ORAL ONCE A DAY
Status: ACTIVE | COMMUNITY

## 2022-06-28 RX ORDER — KRILL/OM-3/DHA/EPA/PHOSPHO/AST 1000-230MG
1 TABLET CAPSULE ORAL ONCE A DAY
Status: ACTIVE | COMMUNITY

## 2022-06-28 RX ORDER — ALPRAZOLAM 0.5 MG/1
1 TABLET TABLET ORAL TWICE A DAY
Status: ON HOLD | COMMUNITY

## 2022-06-28 RX ORDER — ALPRAZOLAM 0.5 MG/1
1 TABLET TABLET ORAL TWICE A DAY
Status: ACTIVE | COMMUNITY

## 2022-06-28 RX ORDER — RED YEAST RICE 600 MG
AS DIRECTED CAPSULE ORAL
Status: ON HOLD | COMMUNITY

## 2022-06-28 RX ORDER — B-COMPLEX WITH VITAMIN C
1 TABLET TABLET ORAL ONCE A DAY
Status: ACTIVE | COMMUNITY

## 2022-06-28 RX ORDER — POLYETHYLENE GLYCOL 3350, SODIUM CHLORIDE, SODIUM BICARBONATE AND POTASSIUM CHLORIDE 420G
AS DIRECTED KIT ORAL ONCE
Qty: 420 GRAM | Refills: 0 | OUTPATIENT
Start: 2022-06-28 | End: 2022-06-29

## 2022-06-28 RX ORDER — POLYETHYLENE GLYCOL 3350, SODIUM SULFATE ANHYDROUS, SODIUM BICARBONATE, SODIUM CHLORIDE, POTASSIUM CHLORIDE 236; 22.74; 6.74; 5.86; 2.97 G/4L; G/4L; G/4L; G/4L; G/4L
AS DIRECTED POWDER, FOR SOLUTION ORAL ONCE
Qty: 236 GRAMS | Refills: 1 | Status: ON HOLD | COMMUNITY

## 2022-06-28 RX ORDER — CALCIUM CARBONATE/VITAMIN D3 600MG-5MCG
1 TABLET WITH A MEAL TABLET ORAL ONCE A DAY
Status: ACTIVE | COMMUNITY

## 2022-06-28 RX ORDER — OMEPRAZOLE 40 MG/1
1 CAPSULE 30 MINUTES BEFORE MORNING MEAL CAPSULE, DELAYED RELEASE ORAL ONCE A DAY
Status: ACTIVE | COMMUNITY

## 2022-06-28 RX ORDER — MAGNESIUM OXIDE/MAG AA CHELATE 300 MG
1 CAPSULE WITH A MEAL CAPSULE ORAL ONCE A DAY
Status: ACTIVE | COMMUNITY

## 2022-06-28 RX ORDER — MULTIVITAMIN
1 TABLET TABLET ORAL ONCE A DAY
Status: ACTIVE | COMMUNITY

## 2022-06-28 NOTE — HPI-TODAY'S VISIT:
She was seen in the office 4/5/2022 for follow-up regarding diarrhea.  Recent CT showed induration in the rectum and fecal impaction resulting in overflow diarrhea.  She was manually disimpacted while in the office.  She was recommended a bowel cleanse with GoLytely followed by resumption of daily bowel regimen with milk of magnesia.  A colonoscopy was planned. Colonoscopy 6/17/2022:Large fecal impaction found on RHONDA status post disimpaction.  Normal colonic mucosa status post random colon biopsies which were unremarkable, negative for microscopic colitis.  Moderate external and internal hemorrhoids.  She was recommended magnesium citrate 400 mg, 2 tablets daily/titrated to effect, with the use of a fleets enema once a week.  Office follow-up with repeat rectal examination was recommended in 3 to 6 weeks. Her symptoms are largely unchanged. She did not have a bowel movement for 4 days after the colonoscopy. She had two good bowel movements last week, but has otherwise experienced continued diarrhea, with a loose stool following most any oral intake. She had multiple episodes of diarrhea yesterday and took Imodium, and has not had a bowel movement since. She is taking Benefiber 1 tablespoon daily, one magnesium citrate tablet daily, and utilizing an enema once per week. No abdominal pain or blood per rectum. A friend accompanies her to the visit.

## 2022-06-28 NOTE — HPI-OTHER HISTORIES
CT of the abdomen and pelvis with contrast 4/22/2022 revealed significant rectal vault distention by fecal material with measured rectal diameter of 9.7 cm and some surrounding perirectal edema or induration making it difficult to exclude stercoral proctitis in the appropriate clinical setting.  Fecal load elsewhere within the large bowel does not appear excessive.  Also notable for cholelithiasis. Labs 4/12/2022:Negative celiac serologies.  Normal serum immunoglobulins.  Normal ESR and CRP.  Normal lipase.  CBC and CMP unremarkable. Repeat stool studies were not performed due to difficulty obtaining a specimen. EGD 4/12/2022 revealed erythematous nonerosive granular mucosa at the GE J, a small 2 cm hiatal hernia, several benign-appearing 4 mm gastric body polyps, mild nonerosive antral gastritis, normal examined duodenum status post D2 biopsies. Stool studies 3/28/2022:Negative GI pathogen panel.  Normal fecal calprotectin.   Colonoscopy 10/19/21 revealed moderate external hemorrhoids but was otherwise unremarkable.  A CT of the chest abdomen and pelvis with contrast was performed given her complaints of dyspnea on exertion and weight loss of 20 pounds over a 6 month period. Chest CT was negative for pulmonary embolus. There were thyroid nodules, which was recommended follow up with thyroid ultrasound. Abdominal CT findings demonstrate a possible rectal mass with constipation and possible obstruction the stool ball in the rectum. A colonoscopy was recommended to exclude cancer. There were noninflammed colonic diverticula, vascular calcifications, and gallstones. Labs revealed a normal lipase, normal CEA, normal CA 19-9, normal TSH, elevated cholesterol, normal BMP and CBC.

## 2022-06-28 NOTE — HPI-TODAY'S VISIT:
77 yo female with a history of hypertension, hyperlipidemia, prediabetes, presenting for follow-up after colonoscopy.

## 2022-08-16 ENCOUNTER — OFFICE VISIT (OUTPATIENT)
Dept: URBAN - METROPOLITAN AREA CLINIC 113 | Facility: CLINIC | Age: 77
End: 2022-08-16

## 2022-10-13 ENCOUNTER — OFFICE VISIT (OUTPATIENT)
Dept: URBAN - METROPOLITAN AREA CLINIC 113 | Facility: CLINIC | Age: 77
End: 2022-10-13
Payer: MEDICARE

## 2022-10-13 ENCOUNTER — WEB ENCOUNTER (OUTPATIENT)
Dept: URBAN - METROPOLITAN AREA CLINIC 113 | Facility: CLINIC | Age: 77
End: 2022-10-13

## 2022-10-13 ENCOUNTER — DASHBOARD ENCOUNTERS (OUTPATIENT)
Age: 77
End: 2022-10-13

## 2022-10-13 VITALS
DIASTOLIC BLOOD PRESSURE: 83 MMHG | HEART RATE: 95 BPM | WEIGHT: 103 LBS | BODY MASS INDEX: 17.58 KG/M2 | TEMPERATURE: 97.1 F | SYSTOLIC BLOOD PRESSURE: 155 MMHG | RESPIRATION RATE: 22 BRPM | HEIGHT: 64 IN

## 2022-10-13 DIAGNOSIS — K59.01 CONSTIPATION BY DELAYED COLONIC TRANSIT: ICD-10-CM

## 2022-10-13 DIAGNOSIS — K56.41 FECAL IMPACTION: ICD-10-CM

## 2022-10-13 PROBLEM — 35298007: Status: ACTIVE | Noted: 2021-09-14

## 2022-10-13 PROCEDURE — 99214 OFFICE O/P EST MOD 30 MIN: CPT | Performed by: NURSE PRACTITIONER

## 2022-10-13 RX ORDER — POLYETHYLENE GLYCOL 3350, SODIUM SULFATE ANHYDROUS, SODIUM BICARBONATE, SODIUM CHLORIDE, POTASSIUM CHLORIDE 236; 22.74; 6.74; 5.86; 2.97 G/4L; G/4L; G/4L; G/4L; G/4L
AS DIRECTED POWDER, FOR SOLUTION ORAL ONCE
Qty: 236 GRAMS | Refills: 1 | Status: ON HOLD | COMMUNITY

## 2022-10-13 RX ORDER — MULTIVITAMIN
1 TABLET TABLET ORAL ONCE A DAY
Status: ACTIVE | COMMUNITY

## 2022-10-13 RX ORDER — KRILL/OM-3/DHA/EPA/PHOSPHO/AST 1000-230MG
1 TABLET CAPSULE ORAL ONCE A DAY
Status: ACTIVE | COMMUNITY

## 2022-10-13 RX ORDER — B-COMPLEX WITH VITAMIN C
1 TABLET TABLET ORAL ONCE A DAY
Status: ACTIVE | COMMUNITY

## 2022-10-13 RX ORDER — MAGNESIUM OXIDE/MAG AA CHELATE 300 MG
1 CAPSULE WITH A MEAL CAPSULE ORAL ONCE A DAY
Status: ACTIVE | COMMUNITY

## 2022-10-13 RX ORDER — ALPRAZOLAM 0.5 MG/1
1 TABLET TABLET ORAL TWICE A DAY
Status: ON HOLD | COMMUNITY

## 2022-10-13 RX ORDER — OMEPRAZOLE 40 MG/1
1 CAPSULE 30 MINUTES BEFORE MORNING MEAL CAPSULE, DELAYED RELEASE ORAL ONCE A DAY
Status: ACTIVE | COMMUNITY

## 2022-10-13 RX ORDER — BIOTIN 10 MG
1 TABLET TABLET ORAL ONCE A DAY
Status: ACTIVE | COMMUNITY

## 2022-10-13 RX ORDER — ACETAMINOPHEN 325 MG
1 TABLET AS NEEDED TABLET ORAL
Status: ACTIVE | COMMUNITY

## 2022-10-13 RX ORDER — ALPRAZOLAM 0.5 MG/1
1 TABLET TABLET ORAL TWICE A DAY
Status: ACTIVE | COMMUNITY

## 2022-10-13 RX ORDER — RED YEAST RICE 600 MG
AS DIRECTED CAPSULE ORAL
Status: ON HOLD | COMMUNITY

## 2022-10-13 RX ORDER — CALCIUM CARBONATE/VITAMIN D3 600MG-5MCG
1 TABLET WITH A MEAL TABLET ORAL ONCE A DAY
Status: ACTIVE | COMMUNITY

## 2022-10-13 RX ORDER — OXYMETAZOLINE HCL 0.05 %
2 SPRAYS IN EACH NOSTRIL AS NEEDED SPRAY, NON-AEROSOL (ML) NASAL TWICE A DAY
Status: ACTIVE | COMMUNITY

## 2022-10-13 NOTE — HPI-TODAY'S VISIT:
She was seen in the office 4/5/2022 for follow-up regarding diarrhea.  Recent CT showed induration in the rectum and fecal impaction resulting in overflow diarrhea.  She was manually disimpacted while in the office.  She was recommended a bowel cleanse with GoLytely followed by resumption of daily bowel regimen with milk of magnesia.  A colonoscopy was planned. Colonoscopy 6/17/2022:Large fecal impaction found on RHONDA status post disimpaction.  Normal colonic mucosa status post random colon biopsies which were unremarkable, negative for microscopic colitis.  Moderate external and internal hemorrhoids.  She was recommended magnesium citrate 400 mg, 2 tablets daily/titrated to effect, with the use of a fleets enema once a week.  Office follow-up with repeat rectal examination was recommended in 3 to 6 weeks. At the time of her last visit on 6/28, she continued to experienced diarrhea related to overflow from chronic constipation. She underwent another disimpaction while in the office. She was recommended a bowel cleanse with Nulytely followed by an increase in magnesium in addition to her Benefiber, with weekly enemas. She did not return for follow-up as scheduled in August. She was seen by her PCP in September and underwent an abdominal xray on 9/1/22 which revealed moderate constipation, with moderate increased fecal material within the rectum and colon. She was started on Linzess 145mcg daily at that time. She was not recommend a bowel purge prior to starting Linzess, and stopped both her Benefiber and magnesium when she began Linzess.  Her symptoms are largely unchanged. She passes a small volume of stool with each urination, multiple times per day. The stools have become more formed and flat, but she still experiences intermittent diarrhea. She denies blood in the stool. She never feels the urge to have a bowel movement, and states she has not had a normal stool in two years. No abdominal pain, nausea or vomiting. Her appetite is good. She has friends who help with grocery shopping and transportation to appointments, but she lives alone. She has significant mobility limitations.

## 2022-12-01 ENCOUNTER — TELEPHONE ENCOUNTER (OUTPATIENT)
Dept: URBAN - METROPOLITAN AREA CLINIC 113 | Facility: CLINIC | Age: 77
End: 2022-12-01

## 2022-12-08 ENCOUNTER — OFFICE VISIT (OUTPATIENT)
Dept: URBAN - METROPOLITAN AREA CLINIC 113 | Facility: CLINIC | Age: 77
End: 2022-12-08

## 2024-12-10 NOTE — PHYSICAL EXAM EYES:
Conjuntivae and eyelids appear normal,  Sclerae : White without injection
Clover Domínguez (Resident)